# Patient Record
Sex: FEMALE | Race: WHITE | NOT HISPANIC OR LATINO | Employment: STUDENT | URBAN - METROPOLITAN AREA
[De-identification: names, ages, dates, MRNs, and addresses within clinical notes are randomized per-mention and may not be internally consistent; named-entity substitution may affect disease eponyms.]

---
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
## 2024-08-07 ENCOUNTER — OFFICE VISIT (OUTPATIENT)
Dept: PEDIATRICS | Facility: CLINIC | Age: 19
End: 2024-08-07
Payer: COMMERCIAL

## 2024-08-07 VITALS — WEIGHT: 157.6 LBS | BODY MASS INDEX: 23.96 KG/M2 | TEMPERATURE: 98.9 F

## 2024-08-07 DIAGNOSIS — H66.92 LEFT ACUTE OTITIS MEDIA: Primary | ICD-10-CM

## 2024-08-07 DIAGNOSIS — Z11.3 SCREENING EXAMINATION FOR STI: ICD-10-CM

## 2024-08-07 PROCEDURE — 87591 N.GONORRHOEAE DNA AMP PROB: CPT

## 2024-08-07 PROCEDURE — 99213 OFFICE O/P EST LOW 20 MIN: CPT | Performed by: PEDIATRICS

## 2024-08-07 PROCEDURE — 87491 CHLMYD TRACH DNA AMP PROBE: CPT

## 2024-08-07 RX ORDER — AMOXICILLIN 875 MG/1
875 TABLET, FILM COATED ORAL 2 TIMES DAILY
Qty: 14 TABLET | Refills: 0 | Status: SHIPPED | OUTPATIENT
Start: 2024-08-07 | End: 2024-08-14

## 2024-08-07 NOTE — PROGRESS NOTES
Subjective   Patient ID: Fannie Bhakta is a 19 y.o. female who is here for concern of Nasal Congestion.    HPI  Fannie started with nasal congestion, rhinorrhea, and cough 5 days ago.  She has not had a fever or shortness of breath.  Her cough is productive.  She notes that her L ear has felt blocked for 1-2 days; she is having difficulty hearing out of it.  She denies vaginal discharge or dysuria, but requests STI screening.    Objective   Temperature 37.2 °C (98.9 °F), temperature source Temporal, weight 71.5 kg (157 lb 9.6 oz).  Physical Exam  Constitutional:       General: She is not in acute distress.     Appearance: She is ill-appearing (mildly).   HENT:      Right Ear: Tympanic membrane and ear canal normal.      Left Ear: Ear canal normal. A middle ear effusion (purulent) is present.      Nose: Congestion present.      Mouth/Throat:      Mouth: Mucous membranes are moist.      Pharynx: Posterior oropharyngeal erythema present. No oropharyngeal exudate.      Tonsils: 2+ on the right. 2+ on the left.   Eyes:      Conjunctiva/sclera: Conjunctivae normal.   Cardiovascular:      Rate and Rhythm: Normal rate and regular rhythm.   Pulmonary:      Effort: Pulmonary effort is normal.      Breath sounds: Normal breath sounds.   Musculoskeletal:      Cervical back: Neck supple.   Lymphadenopathy:      Cervical: No cervical adenopathy.     Assessment/Plan   Problem List Items Addressed This Visit    None  Visit Diagnoses       Left acute otitis media    -  Primary    Relevant Medications    amoxicillin (Amoxil) 875 mg tablet    Screening examination for STI        Relevant Orders    C. Trachomatis / N. Gonorrhoeae, Amplified Detection        Fannie has an ear infection as a complication of her cold.  I have prescribed antibiotics to treat this.  Symptomatic treatment discussed.  Follow-up if not starting to improve in 3 days or sooner if worsens     She will received Gc & CT results through [employer].  If positive, I will  contact her.

## 2024-08-08 LAB
C TRACH RRNA SPEC QL NAA+PROBE: NEGATIVE
N GONORRHOEA DNA SPEC QL PROBE+SIG AMP: NEGATIVE

## 2024-08-20 ENCOUNTER — TELEPHONE (OUTPATIENT)
Dept: PEDIATRICS | Facility: CLINIC | Age: 19
End: 2024-08-20
Payer: COMMERCIAL

## 2024-08-20 DIAGNOSIS — B37.9 YEAST INFECTION: Primary | ICD-10-CM

## 2024-08-20 RX ORDER — FLUCONAZOLE 150 MG/1
150 TABLET ORAL ONCE
Qty: 1 TABLET | Refills: 0 | Status: SHIPPED | OUTPATIENT
Start: 2024-08-20 | End: 2024-08-20

## 2024-08-20 NOTE — TELEPHONE ENCOUNTER
Mom calling- asking if you would call in something for a yeast infection, she was recently on antibiotics and believes she has a yeast infection.  I told her she may need to be seen.  Please advise.     [phone]

## 2024-08-29 PROBLEM — E55.9 VITAMIN D INSUFFICIENCY: Status: ACTIVE | Noted: 2024-08-29

## 2024-08-29 PROBLEM — F32.A MILD DEPRESSIVE DISORDER: Status: ACTIVE | Noted: 2024-08-29

## 2024-08-29 PROBLEM — R10.2 PELVIC PAIN IN FEMALE: Status: ACTIVE | Noted: 2024-08-29

## 2024-08-29 PROBLEM — U07.1 COVID-19 VIRUS DETECTED: Status: RESOLVED | Noted: 2024-08-29 | Resolved: 2024-08-29

## 2024-08-29 PROBLEM — L85.9 HYPERKERATOSIS: Status: ACTIVE | Noted: 2024-08-29

## 2024-08-29 PROBLEM — L81.9 HYPOPIGMENTED SKIN LESION: Status: ACTIVE | Noted: 2024-08-29

## 2024-08-29 PROBLEM — F41.0 PANIC DISORDER: Status: ACTIVE | Noted: 2024-08-29

## 2024-08-29 PROBLEM — F41.1 GAD (GENERALIZED ANXIETY DISORDER): Status: ACTIVE | Noted: 2024-08-29

## 2024-08-29 PROBLEM — L20.9 ATOPIC DERMATITIS: Status: ACTIVE | Noted: 2024-08-29

## 2024-08-29 PROBLEM — E78.5 HYPERLIPIDEMIA: Status: ACTIVE | Noted: 2024-08-29

## 2024-08-30 ENCOUNTER — OFFICE VISIT (OUTPATIENT)
Dept: PEDIATRICS | Facility: CLINIC | Age: 19
End: 2024-08-30
Payer: COMMERCIAL

## 2024-08-30 VITALS — WEIGHT: 157.5 LBS | BODY MASS INDEX: 23.95 KG/M2 | TEMPERATURE: 98.6 F

## 2024-08-30 DIAGNOSIS — J20.9 ACUTE BRONCHITIS, UNSPECIFIED ORGANISM: Primary | ICD-10-CM

## 2024-08-30 DIAGNOSIS — B37.31 YEAST VAGINITIS: ICD-10-CM

## 2024-08-30 DIAGNOSIS — H69.92 DYSFUNCTION OF LEFT EUSTACHIAN TUBE: ICD-10-CM

## 2024-08-30 PROCEDURE — 99213 OFFICE O/P EST LOW 20 MIN: CPT | Performed by: PEDIATRICS

## 2024-08-30 RX ORDER — LEVOFLOXACIN 500 MG/1
500 TABLET, FILM COATED ORAL DAILY
Qty: 10 TABLET | Refills: 0 | Status: SHIPPED | OUTPATIENT
Start: 2024-08-30 | End: 2024-09-09

## 2024-08-30 RX ORDER — FLUTICASONE PROPIONATE 50 MCG
2 SPRAY, SUSPENSION (ML) NASAL AS NEEDED
Qty: 16 G | Refills: 3 | Status: SHIPPED | OUTPATIENT
Start: 2024-08-30

## 2024-08-30 RX ORDER — FLUCONAZOLE 150 MG/1
TABLET ORAL
Qty: 2 TABLET | Refills: 0 | Status: SHIPPED | OUTPATIENT
Start: 2024-08-30

## 2024-08-30 NOTE — PROGRESS NOTES
Subjective   Patient ID: Fannie Bhakta is a 19 y.o. female who is here with her mother, who gives much of the history, for concern of Nasal Congestion and Cough.    HPI  Since her visit with me for L AOM with sinusitis 3 weeks ago, her face started feeling better, but he L ear still feels a bit blocked.  Her coughing increased.  She has not had a fever or shortness of breath, though she notes lower airway noise when falling asleep or walking far and quickly to class.  She did get a yeast infection at the end of her course of amoxicillin.    Objective   Temperature 37 °C (98.6 °F), temperature source Temporal, weight 71.4 kg (157 lb 8 oz).  Physical Exam  Constitutional:       General: She is not in acute distress.     Appearance: She is ill-appearing (mildly).   HENT:      Right Ear: Tympanic membrane and ear canal normal.      Left Ear: Tympanic membrane and ear canal normal.      Nose: Congestion present.      Mouth/Throat:      Mouth: Mucous membranes are moist.      Pharynx: Posterior oropharyngeal erythema present. No oropharyngeal exudate.      Tonsils: 2+ on the right. 2+ on the left.   Eyes:      Conjunctiva/sclera: Conjunctivae normal.   Cardiovascular:      Rate and Rhythm: Normal rate and regular rhythm.   Pulmonary:      Effort: Pulmonary effort is normal. No respiratory distress.      Breath sounds: Normal breath sounds. No wheezing, rhonchi or rales.   Musculoskeletal:      Cervical back: Neck supple.     Assessment/Plan   Problem List Items Addressed This Visit    None  Visit Diagnoses       Acute bronchitis, unspecified organism    -  Primary    Relevant Medications    levoFLOXacin (Levaquin) 500 mg tablet    Dysfunction of left eustachian tube        Relevant Medications    fluticasone (Flonase) 50 mcg/actuation nasal spray    Yeast vaginitis        Relevant Medications    fluconazole (Diflucan) 150 mg tablet        - Do not start fluconazole until off levofloxacin and you get symptoms of a yeast  infection  - Symptomatic treatment discussed.  Follow-up if not starting to improve in 1 week or sooner if worsens

## 2025-06-16 ENCOUNTER — APPOINTMENT (OUTPATIENT)
Facility: CLINIC | Age: 20
End: 2025-06-16
Payer: COMMERCIAL

## 2025-06-16 VITALS
DIASTOLIC BLOOD PRESSURE: 88 MMHG | WEIGHT: 170 LBS | SYSTOLIC BLOOD PRESSURE: 127 MMHG | BODY MASS INDEX: 25.18 KG/M2 | HEIGHT: 69 IN

## 2025-06-16 DIAGNOSIS — Z01.419 WELL WOMAN EXAM WITH ROUTINE GYNECOLOGICAL EXAM: Primary | ICD-10-CM

## 2025-06-16 DIAGNOSIS — Z11.3 ROUTINE SCREENING FOR STI (SEXUALLY TRANSMITTED INFECTION): ICD-10-CM

## 2025-06-16 DIAGNOSIS — Z12.4 CERVICAL CANCER SCREENING: ICD-10-CM

## 2025-06-16 PROCEDURE — 99395 PREV VISIT EST AGE 18-39: CPT | Performed by: STUDENT IN AN ORGANIZED HEALTH CARE EDUCATION/TRAINING PROGRAM

## 2025-06-16 PROCEDURE — 3008F BODY MASS INDEX DOCD: CPT | Performed by: STUDENT IN AN ORGANIZED HEALTH CARE EDUCATION/TRAINING PROGRAM

## 2025-06-16 RX ORDER — CETIRIZINE HYDROCHLORIDE 10 MG/1
10 TABLET ORAL
COMMUNITY

## 2025-06-16 RX ORDER — FLUOXETINE 20 MG/1
1 CAPSULE ORAL
COMMUNITY
Start: 2025-06-11

## 2025-06-16 ASSESSMENT — PATIENT HEALTH QUESTIONNAIRE - PHQ9
1. LITTLE INTEREST OR PLEASURE IN DOING THINGS: NOT AT ALL
SUM OF ALL RESPONSES TO PHQ9 QUESTIONS 1 AND 2: 0
2. FEELING DOWN, DEPRESSED OR HOPELESS: NOT AT ALL

## 2025-06-16 ASSESSMENT — COLUMBIA-SUICIDE SEVERITY RATING SCALE - C-SSRS
2. HAVE YOU ACTUALLY HAD ANY THOUGHTS OF KILLING YOURSELF?: NO
6. HAVE YOU EVER DONE ANYTHING, STARTED TO DO ANYTHING, OR PREPARED TO DO ANYTHING TO END YOUR LIFE?: NO
1. IN THE PAST MONTH, HAVE YOU WISHED YOU WERE DEAD OR WISHED YOU COULD GO TO SLEEP AND NOT WAKE UP?: NO

## 2025-06-16 ASSESSMENT — ENCOUNTER SYMPTOMS
LOSS OF SENSATION IN FEET: 0
DEPRESSION: 0
OCCASIONAL FEELINGS OF UNSTEADINESS: 0

## 2025-06-16 NOTE — PROGRESS NOTES
" Subjective   Fannie Bhakta is a 20 y.o.  who presents today for an annual exam.     -Diet: \"not the best\" feels like she needs to increase fruit/vegetable  -Exercise: yes, used to be more regular    GynHx:  -Menses: regular, heavy for 1-2 days (super + tampons on those days) and bad cramping   -LMP:   -Sexual Activity: yes, female partners  -Sexual Concerns: none   -Contraception: N/a  -Barrier Protection: n/a  -Prior STIs:  no   -STI Screening: yes, will send with pap  -Last pap: never    OBHx: G0  PMHx: dep/anx, BPD 2  SurgHx: wisdom teeth  Meds: Prozac, Lamotrigine  Social:  2x/ week, 4-6 total, vapes currently, no drug use  Lives with parents and brother at home and roommates at school, at [employer]. 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  who presents today for an annual exam.     Health Maintenance  -Pap: first pap today, discussed role of pap smears for cervical cancer screening  -STI: GCCT/trich off pap  -Contraception: active with female partners     Follow up in 1 year for annual exam     Brittney Malik MD     "

## 2025-07-14 LAB
CYTOLOGY CMNT CVX/VAG CYTO-IMP: NORMAL
LAB AP HPV HR: NORMAL
LABORATORY COMMENT REPORT: NORMAL
LMP START DATE: NORMAL
PATH REPORT.TOTAL CANCER: NORMAL